# Patient Record
Sex: MALE | Race: BLACK OR AFRICAN AMERICAN | NOT HISPANIC OR LATINO | Employment: PART TIME | URBAN - METROPOLITAN AREA
[De-identification: names, ages, dates, MRNs, and addresses within clinical notes are randomized per-mention and may not be internally consistent; named-entity substitution may affect disease eponyms.]

---

## 2019-08-29 PROBLEM — E11.9 TYPE 2 DIABETES MELLITUS WITHOUT COMPLICATION, WITHOUT LONG-TERM CURRENT USE OF INSULIN: Status: ACTIVE | Noted: 2019-08-29

## 2019-09-30 PROBLEM — E78.5 HYPERLIPIDEMIA: Status: ACTIVE | Noted: 2018-07-01

## 2019-09-30 PROBLEM — K21.9 GASTROESOPHAGEAL REFLUX DISEASE WITHOUT ESOPHAGITIS: Status: ACTIVE | Noted: 2018-07-01

## 2020-02-24 PROBLEM — E78.2 MIXED HYPERLIPIDEMIA: Status: ACTIVE | Noted: 2018-07-01

## 2020-06-22 PROBLEM — R00.1 BRADYCARDIA WITH 51-60 BEATS PER MINUTE: Chronic | Status: ACTIVE | Noted: 2020-06-22

## 2020-12-14 ENCOUNTER — TELEPHONE (OUTPATIENT)
Dept: ADMINISTRATIVE | Facility: CLINIC | Age: 65
End: 2020-12-14

## 2021-02-23 PROBLEM — I44.1 SECOND DEGREE MOBITZ II AV BLOCK: Status: ACTIVE | Noted: 2021-02-23

## 2021-02-24 DIAGNOSIS — U07.1 COVID-19 VIRUS DETECTED: ICD-10-CM

## 2021-04-23 PROBLEM — Z95.0 PACEMAKER: Status: ACTIVE | Noted: 2021-04-23

## 2021-04-23 PROBLEM — R55 SYNCOPE: Status: ACTIVE | Noted: 2021-04-23

## 2021-04-23 PROBLEM — E87.6 HYPOKALEMIA: Status: ACTIVE | Noted: 2021-04-23

## 2021-04-24 PROBLEM — D62 ACUTE BLOOD LOSS ANEMIA: Status: ACTIVE | Noted: 2021-04-24

## 2021-04-24 PROBLEM — D62 ACUTE BLOOD LOSS ANEMIA: Status: RESOLVED | Noted: 2021-04-24 | Resolved: 2021-04-24

## 2021-04-24 PROBLEM — D64.9 SYMPTOMATIC ANEMIA: Status: ACTIVE | Noted: 2021-04-24

## 2021-04-27 PROBLEM — K31.89 GASTRIC MASS: Status: ACTIVE | Noted: 2021-04-27

## 2021-04-27 PROBLEM — Z95.0 PACEMAKER: Status: RESOLVED | Noted: 2021-04-23 | Resolved: 2021-04-27

## 2021-04-29 PROBLEM — D64.9 SYMPTOMATIC ANEMIA: Status: RESOLVED | Noted: 2021-04-24 | Resolved: 2021-04-29

## 2021-04-29 PROBLEM — E87.6 HYPOKALEMIA: Status: RESOLVED | Noted: 2021-04-23 | Resolved: 2021-04-29

## 2021-04-29 PROBLEM — R55 SYNCOPE: Status: RESOLVED | Noted: 2021-04-23 | Resolved: 2021-04-29

## 2021-05-05 PROBLEM — D50.0 IRON DEFICIENCY ANEMIA DUE TO CHRONIC BLOOD LOSS: Status: ACTIVE | Noted: 2021-05-05

## 2021-10-18 PROBLEM — I48.91 ATRIAL FIBRILLATION: Status: ACTIVE | Noted: 2021-10-18

## 2022-04-25 PROBLEM — I48.91 ATRIAL FIBRILLATION: Status: RESOLVED | Noted: 2021-10-18 | Resolved: 2022-04-25

## 2022-04-25 PROBLEM — Z79.01 ENCOUNTER FOR CURRENT LONG-TERM USE OF ANTICOAGULANTS: Status: ACTIVE | Noted: 2022-04-25

## 2022-04-25 PROBLEM — I48.0 PAROXYSMAL ATRIAL FIBRILLATION: Status: ACTIVE | Noted: 2021-10-18

## 2023-01-11 DIAGNOSIS — E11.9 TYPE 2 DIABETES MELLITUS WITHOUT COMPLICATION: ICD-10-CM

## 2023-01-20 PROBLEM — Z12.11 SCREEN FOR COLON CANCER: Status: ACTIVE | Noted: 2022-04-25

## 2023-01-20 PROBLEM — C49.A3 GIST (GASTROINTESTINAL STROMAL TUMOR) OF SMALL BOWEL, MALIGNANT: Status: ACTIVE | Noted: 2023-01-20

## 2023-01-20 PROBLEM — R93.3 ABNORMAL FINDING ON GI TRACT IMAGING: Status: ACTIVE | Noted: 2023-01-20

## 2023-06-19 PROBLEM — Z87.19 HISTORY OF RECTAL POLYPS: Status: ACTIVE | Noted: 2023-06-19

## 2024-02-27 ENCOUNTER — PATIENT OUTREACH (OUTPATIENT)
Dept: ADMINISTRATIVE | Facility: HOSPITAL | Age: 69
End: 2024-02-27

## 2024-02-27 DIAGNOSIS — E11.9 TYPE 2 DIABETES MELLITUS WITHOUT COMPLICATION, WITHOUT LONG-TERM CURRENT USE OF INSULIN: Primary | ICD-10-CM

## 2024-07-10 ENCOUNTER — PATIENT OUTREACH (OUTPATIENT)
Dept: ADMINISTRATIVE | Facility: HOSPITAL | Age: 69
End: 2024-07-10

## 2024-07-10 DIAGNOSIS — E11.9 TYPE 2 DIABETES MELLITUS WITHOUT COMPLICATION, WITHOUT LONG-TERM CURRENT USE OF INSULIN: Primary | ICD-10-CM

## 2024-08-21 ENCOUNTER — PATIENT OUTREACH (OUTPATIENT)
Dept: ADMINISTRATIVE | Facility: HOSPITAL | Age: 69
End: 2024-08-21

## 2024-08-22 PROBLEM — Z23 IMMUNIZATION DUE: Status: ACTIVE | Noted: 2024-08-22

## 2025-02-19 ENCOUNTER — OUTPATIENT CASE MANAGEMENT (OUTPATIENT)
Dept: ADMINISTRATIVE | Facility: OTHER | Age: 70
End: 2025-02-19

## 2025-02-19 NOTE — PROGRESS NOTES
Called pt regarding medication assistance, no answer left VM to return CW call . pt # [phone] &[phone].      Kamini Torres CP, [facility] [place]    [phone]   [phone] Fax

## 2025-03-05 ENCOUNTER — OUTPATIENT CASE MANAGEMENT (OUTPATIENT)
Dept: ADMINISTRATIVE | Facility: OTHER | Age: 70
End: 2025-03-05

## 2025-03-05 NOTE — PROGRESS NOTES
PAP Follow Up. A call has been placed to [employer]. According to the reprsentative, there is some missing information. They are missing pt POI and page 3 of the application. The requested information has been submitted  to [employer]. A determination will be made in 5 to 7 days. Pt has been updated on enrollment.       Kamini Torres CPhT, [facility] [place]    [phone] Es [phone] Fax

## 2025-03-11 ENCOUNTER — OUTPATIENT CASE MANAGEMENT (OUTPATIENT)
Dept: ADMINISTRATIVE | Facility: OTHER | Age: 70
End: 2025-03-11

## 2025-03-11 NOTE — PROGRESS NOTES
PAP Follow Up. Called [facility]AP regarding [employer]. According to the representative, the requested documents have been received. The application is being processed. A determination will be made in 5 to 7 days. Will keep pt updated on outcome.       Kamini Torres CPhT, [facility] [place]    [phone] Ph  [phone] Fax

## 2025-03-13 ENCOUNTER — OUTPATIENT CASE MANAGEMENT (OUTPATIENT)
Dept: ADMINISTRATIVE | Facility: OTHER | Age: 70
End: 2025-03-13

## 2025-03-13 NOTE — PROGRESS NOTES
PAP follow Up.  Called [employer], pt is not eligible for the program. The pt has to meet a 3% out pocket prescription expense. Once the requested amount is met, [employer] will reconsider the application. Discussed with pt,  the [facility] may can assist with medication. The number and address has been provided.  The nurse has been informed on outcome. No further assistance requested at this time.       Kamini Torres CPhT, [facility] [place]    [phone] Ph  [phone] Fax